# Patient Record
Sex: FEMALE | Race: WHITE
[De-identification: names, ages, dates, MRNs, and addresses within clinical notes are randomized per-mention and may not be internally consistent; named-entity substitution may affect disease eponyms.]

---

## 2018-09-15 ENCOUNTER — HOSPITAL ENCOUNTER (EMERGENCY)
Dept: HOSPITAL 25 - ED | Age: 58
Discharge: HOME | End: 2018-09-15
Payer: COMMERCIAL

## 2018-09-15 VITALS — SYSTOLIC BLOOD PRESSURE: 151 MMHG | DIASTOLIC BLOOD PRESSURE: 64 MMHG

## 2018-09-15 NOTE — ED
Skin Complaint





- HPI Summary


HPI Summary: 


Right-hand-dominant patient presents with left index finger laceration prior to 

arrival.  She reports she was using a clean sharp knife to help her daughter 

unpack and she externally slipped and cut her finger.  She denies numbness, 

tingling, weakness as well as pain or stiffness however she was concerned that 

she couldn't get the bleeding to stop.  She's had a pressure dressing on an as 

she removes it did show me her wound, the bleeding has stopped.  She is not up-

to-date with her tetanus however she declines that she said she is very 

sensitive to many medications and would prefer to avoid a reaction.  She is 

aware of the risks and benefits of proceeding with/avoiding this immunization.  

Reports she wash the wound with soap and water after it happened and wrapped 

with a clean washcloth.








- History of Current Complaint


Chief Complaint: EDLacSutureRecheck


Time Seen by Provider: 09/15/18 16:41


Stated Complaint: LT HAND LAC


Hx Obtained From: Patient, Family/Caretaker - daughter


Pain Intensity: 2





- Allergy/Home Medications


Allergies/Adverse Reactions: 


 Allergies











Allergy/AdvReac Type Severity Reaction Status Date / Time


 


Adhesive Tape Allergy  Blisters Verified 09/15/18 16:35


 


Benzodiazepines Allergy  Eyes Verified 09/15/18 16:35





   Itchy/Swollen/Red/Watery  


 


Cephalosporins Allergy  Rash Verified 09/15/18 16:35


 


clindamycin Allergy  Rash Verified 09/15/18 16:35


 


diphenhydramine Allergy  See Comment Verified 09/15/18 16:35





[From Benadryl]     


 


Penicillins Allergy  Rash Verified 09/15/18 16:35














PMH/Surg Hx/FS Hx/Imm Hx


Previously Healthy: Yes


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 

adhesives - swells and develops blisters locally


   Denies: Hx Anticoagulant Therapy, Hx Blood Disorders





- Immunization History


Immunizations Up to Date: No - by pt choice - "I don't vaccinate"


Infectious Disease History: No


Infectious Disease History: 


   Denies: Traveled Outside the [place] in Last 30 Days





Review of Systems


Musculoskeletal: Negative


Skin: Other - lac


Neurological: Negative


Psychological: Normal


All Other Systems Reviewed And Are Negative: Yes





Physical Exam


Triage Information Reviewed: Yes


Vital Signs On Initial Exam: 


 Initial Vitals











Temp Pulse Resp BP Pulse Ox


 


 97.9 F   90   14   172/80   100 


 


 09/15/18 16:28  09/15/18 16:28  09/15/18 16:28  09/15/18 16:28  09/15/18 16:28











Vital Signs Reviewed: Yes


Appearance: Positive: Well-Appearing, No Pain Distress, Well-Nourished


Skin: Positive: Warm, Skin Color Reflects Adequate Perfusion, Dry - 1cm linear 

lac over Lt dorsal distal index finger - no active bleeding - through epidermis


Head/Face: Positive: Normal Head/Face Inspection


Eyes: Positive: EOMI - wearing dark colored glasses


ENT: Positive: Hearing grossly normal


Respiratory/Lung Sounds: Positive: Breath Sounds Present


Cardiovascular: Positive: Pulses are Symmetrical in both Upper and Lower 

Extremities


Musculoskeletal: Positive: Normal, Strength/ROM Intact


Neurological: Positive: Normal, Sensory/Motor Intact, Alert, Oriented to Person 

Place, Time


Psychiatric: Positive: Normal





Procedures





- Laceration/Wound Repair


  ** 1


Location: upper extremity - Lt index finger, dorsal aspect


Description: Linear


Length, Depth and Shape: 1cm x 2mm


Irrigated w/ Saline (ccs): 250 - water and hibaclens soak


Laceration/Wound Explored: clean


Closure: Skin Adhesive, SteriStrips - splinted - hemodynamically stable 





Diagnostics





- Vital Signs


 Vital Signs











  Temp Pulse Resp BP Pulse Ox


 


 09/15/18 16:28  97.9 F  90  14  172/80  100














- Laboratory


Lab Statement: Any lab studies that have been ordered have been reviewed, and 

results considered in the medical decision making process.





Course/Dx





- Diagnoses


Provider Diagnoses: 


 Laceration of left index finger








Discharge





- Sign-Out/Discharge


Documenting (check all that apply): Patient Departure





- Discharge Plan


Condition: Stable


Disposition: HOME


Patient Education Materials:  Finger Laceration (ED), Skin Adhesive Care (ED)


Additional Instructions: 


Keep Dressing clean and dry and in place for the next 5 days.  After that time 

you may remove dressing, gently wash wound with soap and water, rinse well and 

pat dry with clean cloth.  Reapply splint to prevent opening wound unless it 

has closed at this time. Avoid soaking. 





For pain, you may try rest, ice and elevation along with ibuprofen with food as 

needed 





* If you develop redness, swelling, streaking, purulent drainage, fevers or 

chills, seek medical attention sooner or return to the emergency department.





- Billing Disposition and Condition


Condition: STABLE


Disposition: Home